# Patient Record
Sex: MALE | URBAN - METROPOLITAN AREA
[De-identification: names, ages, dates, MRNs, and addresses within clinical notes are randomized per-mention and may not be internally consistent; named-entity substitution may affect disease eponyms.]

---

## 2019-10-11 ENCOUNTER — DOCUMENTATION ONLY (OUTPATIENT)
Dept: ORTHOPEDICS | Facility: CLINIC | Age: 59
End: 2019-10-11

## 2019-10-11 NOTE — PROGRESS NOTES
Received email from leida khanna, requesting review of films for patient.  Response sent to dr mauricio today.  Notes in my office,  Cannot be put into medical chart as patient not been seen.    Nichole burgos